# Patient Record
Sex: FEMALE | Race: WHITE
[De-identification: names, ages, dates, MRNs, and addresses within clinical notes are randomized per-mention and may not be internally consistent; named-entity substitution may affect disease eponyms.]

---

## 2018-06-19 ENCOUNTER — HOSPITAL ENCOUNTER (EMERGENCY)
Dept: HOSPITAL 11 - JP.ED | Age: 33
Discharge: HOME | End: 2018-06-19
Payer: COMMERCIAL

## 2018-06-19 DIAGNOSIS — Z88.1: ICD-10-CM

## 2018-06-19 DIAGNOSIS — T78.40XA: Primary | ICD-10-CM

## 2018-06-19 DIAGNOSIS — Z88.5: ICD-10-CM

## 2018-06-19 DIAGNOSIS — Z79.899: ICD-10-CM

## 2018-06-19 NOTE — EDM.PDOC
ED HPI GENERAL MEDICAL PROBLEM





- General


Chief Complaint: Allergic Reaction


Stated Complaint: REACTION


Time Seen by Provider: 06/19/18 20:15


Source of Information: Reports: Patient, RN


History Limitations: Reports: No Limitations





- History of Present Illness


INITIAL COMMENTS - FREE TEXT/NARRATIVE: 





32 yo female with citrus allergy kissed her daughter after the daughter had 

eaten an orange. Liliana got a scratchy throat and lip swelling a few minutes 

later. Took Epi Pen and diphenhydramine 50 mg po and now has only a minimal 

scratchy throat. Is visiting from out of town. 


Onset: Today


Onset Date: 06/19/18


Onset Time: 18:40


Duration: Minutes:, Improving


Location: Reports: Face (lips), Neck (throat)


Quality: Reports: Other (no pain)


Severity: Mild


Improves with: Reports: Medication


Worsens with: Reports: Other (citrus)


Context: Reports: Other (Hx of citrus allergy)


Associated Symptoms: Reports: No Other Symptoms


Treatments PTA: Reports: Other (see below) (see HPI)





- Related Data


 Allergies











Allergy/AdvReac Type Severity Reaction Status Date / Time


 


azithromycin [From Zithromax] Allergy  Rash Verified 06/19/18 20:21


 


meperidine [From Demerol] Allergy  Vomiting Verified 06/19/18 20:21











Home Meds: 


 Home Meds





EPINEPHrine [Epipen] 1 dose IM ASDIRECTED PRN 06/19/18 [History]


buPROPion HCl [Wellbutrin Xl] 300 mg PO DAILY 06/19/18 [History]











ED ROS ALLERGIC REACTION





- Review of Systems


Review Of Systems: See Below


Constitutional: Reports: No Symptoms


HEENT: Reports: Other (lip swelling/scratchy throat)


Respiratory: Reports: No Symptoms


Cardiovascular: Reports: No Symptoms


Skin: Reports: No Symptoms


Neurological: Reports: No Symptoms





ED EXAM GENERAL NO PERIP PULSE





- Physical Exam


Exam: See Below


Exam Limited By: No Limitations


General Appearance: Alert, WD/WN, No Apparent Distress


Eye Exam: Bilateral Eye: Normal Inspection


Ears: Normal External Exam, Normal Canal, Hearing Grossly Normal


Nose: Normal Inspection, Normal Mucosa, No Blood


Throat/Mouth: Normal Inspection, Normal Lips, Normal Oropharynx, Normal Voice, 

No Airway Compromise


Head: Atraumatic, Normocephalic


Neck: Normal Inspection, Supple


Respiratory/Chest: No Respiratory Distress, Lungs Clear, Normal Breath Sounds, 

No Accessory Muscle Use


Cardiovascular: Regular Rate, Rhythm, No Edema


Extremities: Normal Inspection


Neurological: Alert, Oriented, CN II-XII Intact, Normal Cognition, No Motor/

Sensory Deficits


Psychiatric: Normal Affect, Normal Mood


Skin Exam: Warm, Dry, Intact, Normal Color, No Rash


Lymphatic: No Adenopathy





Course





- Vital Signs


Text/Narrative:: 





Sx's continued to [place] during stay, almost 100% resolved at time of discharge.


Last Recorded V/S: 


 Last Vital Signs











Temp  37.3 C   06/19/18 20:28


 


Pulse  74   06/19/18 20:28


 


Resp  18   06/19/18 20:28


 


BP  142/84 H  06/19/18 20:28


 


Pulse Ox  99   06/19/18 20:28














Departure





- Departure


Time of Disposition: 20:56


Disposition: Home, Self-Care 01


Condition: Good


Clinical Impression: 


Allergic reaction


Qualifiers:


 Encounter type: initial encounter Qualified Code(s): T78.40XA - Allergy, 

unspecified, initial encounter








- Discharge Information


Referrals: 


PCP,None [Primary Care Provider] - 


Forms:  ED Department Discharge